# Patient Record
Sex: FEMALE | URBAN - METROPOLITAN AREA
[De-identification: names, ages, dates, MRNs, and addresses within clinical notes are randomized per-mention and may not be internally consistent; named-entity substitution may affect disease eponyms.]

---

## 2023-10-14 ENCOUNTER — APPOINTMENT (RX ONLY)
Dept: URBAN - METROPOLITAN AREA CLINIC 340 | Facility: CLINIC | Age: 58
Setting detail: DERMATOLOGY
End: 2023-10-14

## 2023-10-14 DIAGNOSIS — Z41.9 ENCOUNTER FOR PROCEDURE FOR PURPOSES OTHER THAN REMEDYING HEALTH STATE, UNSPECIFIED: ICD-10-CM

## 2023-10-14 PROCEDURE — ? DYSPORT

## 2023-10-14 PROCEDURE — ? BOTOX

## 2023-10-14 PROCEDURE — ? OTHER

## 2023-10-14 NOTE — PROCEDURE: OTHER
Note Text (......Xxx Chief Complaint.): This diagnosis correlates with the
Other (Free Text): Physician under immediately available supervision.  Written tx plan discussed and approved.  10% off for Oct cosmetic Saturday.
Detail Level: Zone
Render Risk Assessment In Note?: no

## 2023-10-14 NOTE — PROCEDURE: DYSPORT
Price (Use Numbers Only, No Special Characters Or $): 561 Price (Use Numbers Only, No Special Characters Or $): 222

## 2023-10-14 NOTE — PROCEDURE: BOTOX
Comments: Physician under immediately available supervision.  Written tx plan discussed and approved.

## 2023-10-14 NOTE — PROCEDURE: BOTOX
Price (Use Numbers Only, No Special Characters Or $): 8602 Price (Use Numbers Only, No Special Characters Or $): 1311

## 2024-09-28 ENCOUNTER — RX ONLY (OUTPATIENT)
Age: 59
Setting detail: RX ONLY
End: 2024-09-28

## 2024-09-28 ENCOUNTER — APPOINTMENT (RX ONLY)
Dept: URBAN - METROPOLITAN AREA CLINIC 340 | Facility: CLINIC | Age: 59
Setting detail: DERMATOLOGY
End: 2024-09-28

## 2024-09-28 DIAGNOSIS — Z41.9 ENCOUNTER FOR PROCEDURE FOR PURPOSES OTHER THAN REMEDYING HEALTH STATE, UNSPECIFIED: ICD-10-CM

## 2024-09-28 PROCEDURE — ? DAXXIFY

## 2024-09-28 RX ORDER — TRETIONIN 1 MG/G
CREAM TOPICAL
Qty: 45 | Refills: 3 | Status: ERX | COMMUNITY
Start: 2024-09-28

## 2024-09-28 NOTE — PROCEDURE: DAXXIFY
Show Nasal Units: Yes
Lot #: [id number]
Left Pupillary Line Units: 0
Additional Area 2 Units: 4
Additional Area 2 Location: lip flip
Detail Level: Detailed
Dilution (U/0.1 Cc): 10
Additional Area 3 Location: lateral eyebrow
Price (Use Numbers Only, No Special Characters Or $): 213
Nasal Root Units: 2
Consent: Written consent obtained. Risks include but not limited to lid/brow ptosis, bruising, swelling, diplopia, temporary effect, incomplete chemical denervation.
Show Ucl Units: No
Comments: Physician under immediately available supervision.  Written tx plan discussed and approved.
Post-Care Instructions: Patient instructed to not lie down for 4 hours and limit physical activity for 24 hours.
Patient Specific Comments (Will Not Stick From Patient To Patient): 62 total units
Forehead Units: 15
Additional Area 1 Location: saddle
Expiration Date (Month Year): 05/2025
Additional Area 1 Units: 6
Bill Summary Price Listed Below, Or Bill Total Of Units X Price Per Unit?: Bill Summary Price Below
Glabellar Complex Units: 25

## 2025-05-09 ENCOUNTER — APPOINTMENT (OUTPATIENT)
Dept: URBAN - METROPOLITAN AREA CLINIC 340 | Facility: CLINIC | Age: 60
Setting detail: DERMATOLOGY
End: 2025-05-09

## 2025-05-09 DIAGNOSIS — Z41.9 ENCOUNTER FOR PROCEDURE FOR PURPOSES OTHER THAN REMEDYING HEALTH STATE, UNSPECIFIED: ICD-10-CM

## 2025-05-09 PROCEDURE — ? DYSPORT

## 2025-05-09 ASSESSMENT — LOCATION DETAILED DESCRIPTION DERM: LOCATION DETAILED: LEFT INFERIOR MEDIAL FOREHEAD

## 2025-05-09 ASSESSMENT — LOCATION SIMPLE DESCRIPTION DERM: LOCATION SIMPLE: LEFT FOREHEAD

## 2025-05-09 ASSESSMENT — LOCATION ZONE DERM: LOCATION ZONE: FACE

## 2025-05-09 NOTE — PROCEDURE: DYSPORT
Show Additional Area 4: Yes
Glabellar Complex Units: 30
R Brow Units: 0
Dilution (U/0.1 Cc): 10
Lot #: [id number]
Show Mentalis Units: No
Detail Level: Detailed
Post-Care Instructions: Patient instructed to not lie down for 4 hours and limit physical activity for 24 hours.
Additional Area 1 Units: 6
Periorbital Skin Units: 24
Additional Area 1 Location: lip flip
Consent: Written consent obtained. Risks include but not limited to lid/brow ptosis, bruising, swelling, diplopia, temporary effect, incomplete chemical denervation.
Price (Use Numbers Only, No Special Characters Or $): 736
Expiration Date (Month Year): 08/31/26

## 2025-08-23 ENCOUNTER — APPOINTMENT (OUTPATIENT)
Dept: URBAN - METROPOLITAN AREA CLINIC 340 | Facility: CLINIC | Age: 60
Setting detail: DERMATOLOGY
End: 2025-08-23

## 2025-08-23 DIAGNOSIS — Z41.9 ENCOUNTER FOR PROCEDURE FOR PURPOSES OTHER THAN REMEDYING HEALTH STATE, UNSPECIFIED: ICD-10-CM

## 2025-08-23 PROCEDURE — ? DAXXIFY
